# Patient Record
Sex: MALE | Race: WHITE | NOT HISPANIC OR LATINO
[De-identification: names, ages, dates, MRNs, and addresses within clinical notes are randomized per-mention and may not be internally consistent; named-entity substitution may affect disease eponyms.]

---

## 2017-03-02 ENCOUNTER — APPOINTMENT (OUTPATIENT)
Dept: HUMAN REPRODUCTION | Facility: CLINIC | Age: 37
End: 2017-03-02

## 2018-11-27 ENCOUNTER — APPOINTMENT (OUTPATIENT)
Dept: ORTHOPEDIC SURGERY | Facility: CLINIC | Age: 38
End: 2018-11-27
Payer: COMMERCIAL

## 2018-11-27 VITALS — HEIGHT: 71 IN | BODY MASS INDEX: 30.8 KG/M2 | WEIGHT: 220 LBS

## 2018-11-27 VITALS — HEART RATE: 93 BPM | SYSTOLIC BLOOD PRESSURE: 154 MMHG | DIASTOLIC BLOOD PRESSURE: 92 MMHG

## 2018-11-27 DIAGNOSIS — Z78.9 OTHER SPECIFIED HEALTH STATUS: ICD-10-CM

## 2018-11-27 DIAGNOSIS — F19.90 OTHER PSYCHOACTIVE SUBSTANCE USE, UNSPECIFIED, UNCOMPLICATED: ICD-10-CM

## 2018-11-27 DIAGNOSIS — Z87.891 PERSONAL HISTORY OF NICOTINE DEPENDENCE: ICD-10-CM

## 2018-11-27 PROCEDURE — 99204 OFFICE O/P NEW MOD 45 MIN: CPT

## 2018-11-27 PROCEDURE — 73564 X-RAY EXAM KNEE 4 OR MORE: CPT | Mod: RT

## 2018-12-11 ENCOUNTER — OTHER (OUTPATIENT)
Age: 38
End: 2018-12-11

## 2018-12-13 ENCOUNTER — OUTPATIENT (OUTPATIENT)
Dept: OUTPATIENT SERVICES | Facility: HOSPITAL | Age: 38
LOS: 1 days | End: 2018-12-13
Payer: COMMERCIAL

## 2018-12-13 VITALS
SYSTOLIC BLOOD PRESSURE: 134 MMHG | RESPIRATION RATE: 18 BRPM | HEIGHT: 71 IN | DIASTOLIC BLOOD PRESSURE: 90 MMHG | WEIGHT: 220.02 LBS | OXYGEN SATURATION: 97 %

## 2018-12-13 DIAGNOSIS — S83.289A OTHER TEAR OF LATERAL MENISCUS, CURRENT INJURY, UNSPECIFIED KNEE, INITIAL ENCOUNTER: ICD-10-CM

## 2018-12-13 DIAGNOSIS — Z98.890 OTHER SPECIFIED POSTPROCEDURAL STATES: Chronic | ICD-10-CM

## 2018-12-13 DIAGNOSIS — Z01.818 ENCOUNTER FOR OTHER PREPROCEDURAL EXAMINATION: ICD-10-CM

## 2018-12-13 DIAGNOSIS — G47.33 OBSTRUCTIVE SLEEP APNEA (ADULT) (PEDIATRIC): ICD-10-CM

## 2018-12-13 DIAGNOSIS — S83.281A OTHER TEAR OF LATERAL MENISCUS, CURRENT INJURY, RIGHT KNEE, INITIAL ENCOUNTER: ICD-10-CM

## 2018-12-13 PROCEDURE — G0463: CPT

## 2018-12-13 NOTE — H&P PST ADULT - PMH
Other tear of lateral meniscus of knee BRITTANY (obstructive sleep apnea)  Criteria  Other tear of lateral meniscus of knee

## 2018-12-13 NOTE — H&P PST ADULT - HISTORY OF PRESENT ILLNESS
38 year old healthy male  presents with right knee pain which began after he twisted his knee when his skis in February 2017 . He states he can no longer ride a bike, ski or jog. At times he has pain when walking and climbing steps. Presents to PST for scheduled Right Knee Arthroscopy ,Meniscectomy on 12/20/2018.

## 2018-12-13 NOTE — H&P PST ADULT - PROBLEM SELECTOR PLAN 1
Right Knee Arthroscopy ,Meniscectomy on 12/20/18  Pre- Op instructions discussed   Pre- emptive ordered

## 2018-12-13 NOTE — H&P PST ADULT - NSANTHOSAYNRD_GEN_A_CORE
mild/No. BRITTANY screening performed.  STOP BANG Legend: 0-2 = LOW Risk; 3-4 = INTERMEDIATE Risk; 5-8 = HIGH Risk

## 2018-12-28 ENCOUNTER — APPOINTMENT (OUTPATIENT)
Dept: ORTHOPEDIC SURGERY | Facility: HOSPITAL | Age: 38
End: 2018-12-28

## 2019-01-04 ENCOUNTER — APPOINTMENT (OUTPATIENT)
Dept: ORTHOPEDIC SURGERY | Facility: CLINIC | Age: 39
End: 2019-01-04

## 2020-01-02 PROBLEM — Z00.00 ENCOUNTER FOR PREVENTIVE HEALTH EXAMINATION: Noted: 2020-01-02

## 2020-01-03 ENCOUNTER — APPOINTMENT (OUTPATIENT)
Dept: ORTHOPEDIC SURGERY | Facility: CLINIC | Age: 40
End: 2020-01-03

## 2020-01-03 ENCOUNTER — APPOINTMENT (OUTPATIENT)
Dept: ORTHOPEDIC SURGERY | Facility: CLINIC | Age: 40
End: 2020-01-03
Payer: COMMERCIAL

## 2020-01-03 VITALS
HEIGHT: 70 IN | DIASTOLIC BLOOD PRESSURE: 100 MMHG | HEART RATE: 91 BPM | WEIGHT: 220 LBS | SYSTOLIC BLOOD PRESSURE: 153 MMHG | BODY MASS INDEX: 31.5 KG/M2

## 2020-01-03 DIAGNOSIS — M79.10 MYALGIA, UNSPECIFIED SITE: ICD-10-CM

## 2020-01-03 DIAGNOSIS — M60.9 MYOSITIS, UNSPECIFIED: ICD-10-CM

## 2020-01-03 DIAGNOSIS — M54.5 LOW BACK PAIN: ICD-10-CM

## 2020-01-03 DIAGNOSIS — M46.1 SACROILIITIS, NOT ELSEWHERE CLASSIFIED: ICD-10-CM

## 2020-01-03 PROCEDURE — 99214 OFFICE O/P EST MOD 30 MIN: CPT

## 2020-01-03 PROCEDURE — 72100 X-RAY EXAM L-S SPINE 2/3 VWS: CPT

## 2020-01-03 RX ORDER — PENICILLIN V POTASSIUM 500 MG/1
500 TABLET, FILM COATED ORAL
Qty: 20 | Refills: 0 | Status: DISCONTINUED | COMMUNITY
Start: 2019-10-03

## 2020-01-03 RX ORDER — DICLOFENAC SODIUM 75 MG/1
75 TABLET, DELAYED RELEASE ORAL
Qty: 60 | Refills: 2 | Status: ACTIVE | COMMUNITY
Start: 2020-01-03 | End: 1900-01-01

## 2020-01-03 NOTE — DISCUSSION/SUMMARY
[de-identified] : left sacroiliitis\par We discussed all options. \par injection\par I offered an injection after all risks were explained including allergic reaction to an infection under sterile conditions 1 mg of Depo-Medrol and 2 cc of 1% lidocaine without epinephrine was injected into the painful site. The patient tolerated the procedure well and received significant relief following the injection.\par Voltaren and lumbar brochure, if no better 3-4 weeks MRI lumbar. \par All options discussed including rest, medicine, home exercise, acupuncture, Chiropractic care, Physical Therapy, Pain management, and last resort surgery. \par All questions were answered, all alternatives discussed and the patient is in complete agreement with that plan. Follow-up appointment as instructed. Any issues and the patient will call or come in sooner.

## 2020-01-03 NOTE — PHYSICAL EXAM
[UE/LE] : Sensory: Intact in bilateral upper & lower extremities [ALL] : dorsalis pedis, posterior tibial, femoral, popliteal, and radial 2+ and symmetric bilaterally [Normal] : Gait: normal [Poor Appearance] : well-appearing [Acute Distress] : not in acute distress [de-identified] : 5 out of 5 motor strength, sensation is intact and symmetrical full range of motion flexion extension and rotation, no palpatory tenderness full range of motion of hips knees shoulders and elbows (all four extremities), no atrophy, negative straight leg raise, no pathological reflexes, no swelling, normal ambulation, no apparent distress skin is intact, no palpable lymph nodes, no upper or lower extremity instability, alert and oriented x3 and normal mood. Normal finger-to nose test. Left SI joint pain. [de-identified] : Xr AP/lateral lumbar spine 1/03/20:adequate-Reviewed with patient.

## 2020-01-03 NOTE — ADDENDUM
[FreeTextEntry1] : This note was authored by Lina Stephens working as a medical scribe for Dr. Tanner Villalba. The note was reviewed, edited, and revised by Dr. Tanner Villalba whom is in agreement with the exam findings, imaging findings, and treatment plan. Jan 03, 2020

## 2020-01-03 NOTE — HISTORY OF PRESENT ILLNESS
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 This history is in addition to the intake form that I personally reviewed. \par \par No fever chills sweats nausea vomiting no bowel or bladder dysfunction, no recent weight loss or gain no night pain. This history is in addition to the intake form that I personally reviewed.

## 2021-11-17 PROBLEM — G47.33 OBSTRUCTIVE SLEEP APNEA (ADULT) (PEDIATRIC): Chronic | Status: ACTIVE | Noted: 2018-12-13

## 2021-11-17 PROBLEM — S83.289A OTHER TEAR OF LATERAL MENISCUS, CURRENT INJURY, UNSPECIFIED KNEE, INITIAL ENCOUNTER: Chronic | Status: ACTIVE | Noted: 2018-12-13

## 2021-11-23 ENCOUNTER — APPOINTMENT (OUTPATIENT)
Dept: ORTHOPEDIC SURGERY | Facility: CLINIC | Age: 41
End: 2021-11-23
Payer: COMMERCIAL

## 2021-11-23 ENCOUNTER — NON-APPOINTMENT (OUTPATIENT)
Age: 41
End: 2021-11-23

## 2021-11-23 VITALS
WEIGHT: 220 LBS | DIASTOLIC BLOOD PRESSURE: 87 MMHG | HEIGHT: 70 IN | HEART RATE: 91 BPM | BODY MASS INDEX: 31.5 KG/M2 | SYSTOLIC BLOOD PRESSURE: 150 MMHG

## 2021-11-23 DIAGNOSIS — M25.512 PAIN IN LEFT SHOULDER: ICD-10-CM

## 2021-11-23 PROCEDURE — 73564 X-RAY EXAM KNEE 4 OR MORE: CPT | Mod: RT

## 2021-11-23 PROCEDURE — 73030 X-RAY EXAM OF SHOULDER: CPT | Mod: LT

## 2021-11-23 PROCEDURE — 99214 OFFICE O/P EST MOD 30 MIN: CPT

## 2021-11-23 NOTE — PHYSICAL EXAM
[LE] : Sensory: Intact in bilateral lower extremities [DP] : dorsalis pedis 2+ and symmetric bilaterally [PT] : posterior tibial 2+ and symmetric bilaterally [Rad] : radial 2+ and symmetric bilaterally [Normal] : Alert and in no acute distress [Poor Appearance] : well-appearing [Acute Distress] : not in acute distress [Obese] : not obese [de-identified] : The patient has no respiratory distress. Mood and affect are normal. The patient is alert and oriented to person, place and time.\par Examination of the cervical spine demonstrates no tenderness, no deformity and no muscle spasm. Cervical spine rotation is 60° to the right, 60° to the left, 75° of extension and 45° of flexion. Neurologic exam of the upper extremities reveals intact sensation to light touch. Motor function is 5 over 5 in all groups. Deep tendon reflexes are 2+ and equal at the biceps, triceps and brachioradialis.\par Examination of the left shoulder demonstrates tenderness at the left AC joint.  There is pain in this area with elevation and with adduction.  There is no skin compromise.  There is no glenohumeral instability.  Drop arm test is negative.  Belly press test is negative.  Impingement is positive.  The elbows are stable and nontender.  The skin is intact.  There is no lymphedema.\par There is no pain with motion of the hips.  There is no tenderness of either hip.  Examination of the right knee demonstrates lateral joint line tenderness.  Florencia test is positive laterally.  Bounce test is positive laterally.  There is no instability of collateral or cruciate ligaments.  Range of motion 0 to 115 degrees right, 0 to 115 degrees left.  The calves are soft and nontender.  The skin is intact.  There is no lymphedema. [de-identified] : AP, transscapular and axillary x-rays of the left shoulder demonstrate no acute fracture or dislocation.  There is evidence of old type III acromioclavicular injury.\par AP, lateral, tunnel and sunrise x-rays of the right knee taken today demonstrate no fracture or dislocation.  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

## 2021-11-23 NOTE — DISCUSSION/SUMMARY
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  I have discussed the pathology, natural history and treatment options with him.  Treatment will be home exercises and over-the-counter medication.  In terms of his right knee he has a known tear of his lateral meniscus.  Treatment options were discussed in detail.  He would like to try a course of physical therapy.  He will be reevaluated in 1 month.

## 2021-11-23 NOTE — HISTORY OF PRESENT ILLNESS
[de-identified] : This 41-year-old left-hand-dominant man sustained a left shoulder AC joint separation 10 years ago. This was treated nonoperatively. He has been having an increase in pain over the last 6 months without recent injury. The pain is worse with flexion of his shoulder. He is not taking any medication. He denies upper extremity numbness or tingling.\par He also requests evaluation of his right knee. He was seen 3 years ago and was diagnosed with a torn lateral meniscus. Arthroscopic surgery was recommended but not approved by his insurance carrier. He reports that he is continuing to experience pain in his right knee on the lateral aspect. Recently the pain has been getting worse.

## 2022-01-04 ENCOUNTER — APPOINTMENT (OUTPATIENT)
Dept: ORTHOPEDIC SURGERY | Facility: CLINIC | Age: 42
End: 2022-01-04

## 2022-10-06 ENCOUNTER — APPOINTMENT (OUTPATIENT)
Dept: ORTHOPEDIC SURGERY | Facility: CLINIC | Age: 42
End: 2022-10-06

## 2022-10-06 VITALS — WEIGHT: 220 LBS | HEIGHT: 71 IN | BODY MASS INDEX: 30.8 KG/M2

## 2022-10-06 DIAGNOSIS — M79.18 MYALGIA, OTHER SITE: ICD-10-CM

## 2022-10-06 DIAGNOSIS — I10 ESSENTIAL (PRIMARY) HYPERTENSION: ICD-10-CM

## 2022-10-06 PROCEDURE — 99204 OFFICE O/P NEW MOD 45 MIN: CPT

## 2022-10-06 PROCEDURE — 73564 X-RAY EXAM KNEE 4 OR MORE: CPT | Mod: LT

## 2022-10-06 RX ORDER — AMLODIPINE AND ATORVASTATIN 2.5; 4 MG/1; MG/1
TABLET, COATED ORAL
Refills: 0 | Status: ACTIVE | COMMUNITY

## 2022-10-06 RX ORDER — NAPROXEN 500 MG/1
500 TABLET ORAL TWICE DAILY
Qty: 60 | Refills: 0 | Status: ACTIVE | COMMUNITY
Start: 2022-10-06 | End: 1900-01-01

## 2022-10-06 NOTE — HISTORY OF PRESENT ILLNESS
[de-identified] : 42 year old male  ( LHD,  Rep  )  left knee pain since in [place] for bachelor party then acute worse since twisted knee with his 4 year old on 10/2/22. \par The pain is located lateral\par The pain is associated with  swelling, locking, catching.\par Worse with activity and better at rest.\par Has tried ice, advil\par

## 2022-10-06 NOTE — IMAGING
[de-identified] : LEFT KNEE\par Inspection:  mild effusion\par Palpation: lateral joint line tenderness \par Knee Range of Motion:  0-130 \par Strength: 5/5 Quadriceps strength, 5/5 Hamstring strength\par Neurological: light touch is intact throughout\par Ligament Stability and Special Tests: \par McMurrays: Positive\par Lachman: neg\par Pivot Shift: neg\par Posterior Drawer: neg\par Valgus: neg\par Varus: neg\par Patella Apprehension: neg\par Patella Maltracking: neg\par \par

## 2022-10-06 NOTE — ASSESSMENT
[FreeTextEntry1] : Left X-Ray Examination of the KNEE (4 views): there are no fractures, subluxations or dislocations.\par \par Due to the patients mechanical symptoms along with lateral joint line pain, effusion, and pos tali test on exam we will get an mri to eval for lateral meniscus tear\par \par - The patient was advised to apply ice (wrapped in a towel or protective covering) to the area daily (20 minutes at a time, 2-4X/day).\par - The patient was advised to modify their activities.\par - naproxen \par - Patient was given a prescription for an anti-inflammatory medication.  They will take it for the next week and then on an as needed basis, as long as there are no medical contra-indications.  Patient is counseled on possible GI, renal, and cardiovascular side effects.\par - f/u after mri\par

## 2022-10-11 ENCOUNTER — FORM ENCOUNTER (OUTPATIENT)
Age: 42
End: 2022-10-11

## 2022-10-12 ENCOUNTER — APPOINTMENT (OUTPATIENT)
Dept: MRI IMAGING | Facility: CLINIC | Age: 42
End: 2022-10-12

## 2022-10-12 PROCEDURE — 73721 MRI JNT OF LWR EXTRE W/O DYE: CPT | Mod: LT

## 2022-10-17 ENCOUNTER — APPOINTMENT (OUTPATIENT)
Dept: ORTHOPEDIC SURGERY | Facility: CLINIC | Age: 42
End: 2022-10-17

## 2022-10-17 DIAGNOSIS — M76.892 OTHER SPECIFIED ENTHESOPATHIES OF LEFT LOWER LIMB, EXCLUDING FOOT: ICD-10-CM

## 2022-10-17 DIAGNOSIS — M71.22 SYNOVIAL CYST OF POPLITEAL SPACE [BAKER], LEFT KNEE: ICD-10-CM

## 2022-10-17 PROCEDURE — 99214 OFFICE O/P EST MOD 30 MIN: CPT

## 2022-10-17 NOTE — IMAGING
[de-identified] : LEFT KNEE\par Inspection:  mild effusion\par Palpation: lateral joint line tenderness \par Knee Range of Motion:  0-130 \par Strength: 5/5 Quadriceps strength, 5/5 Hamstring strength\par Neurological: light touch is intact throughout\par Ligament Stability and Special Tests: \par McMurrays: Positive\par Lachman: neg\par Pivot Shift: neg\par Posterior Drawer: neg\par Valgus: neg\par Varus: neg\par Patella Apprehension: neg\par Patella Maltracking: neg\par \par

## 2022-10-17 NOTE — HISTORY OF PRESENT ILLNESS
[de-identified] : 42 year old male  ( LHD,  Rep  )  left knee pain since in [place] for bachelor party then acute worse since twisted knee with his 4 year old on 10/2/22. \par The pain is located lateral\par The pain is associated with  swelling, locking, catching.\par Worse with activity and better at rest.\par Has tried ice, advil\par \par 10/17/22 - had mri shwoing rup ganglia no tears

## 2022-10-17 NOTE — ASSESSMENT
[FreeTextEntry1] : mri left knee 10/12/22 - no tears, rup ganglia over lat meniscus, fluid around it band and fat pad, acl ganglia, quad tendonitis\par \par \par - The patient was advised of the diagnosis.  The natural history of the pathology was explained to the patient in layman's terms.  Several different treatment options were discussed and explained including the risks and benefits of both surgical and non-surgical treatments.  All questions and concerns were answered.\par - We will continue conservative treatment with PT, icing, and anti-inflammatory medications.\par - The patient was advised to let pain guide the gradual advancement of activities.\par - napryn \par - Patient was given a prescription for an anti-inflammatory medication.  They will take it for the next week and then on an as needed basis, as long as there are no medical contra-indications.  Patient is counseled on possible GI, renal, and cardiovascular side effects.\par - fu as needed

## 2022-10-17 NOTE — DATA REVIEWED
[MRI] : MRI [Left] : left [I independently reviewed and interpreted images and report] : I independently reviewed and interpreted images and report [Knee] : knee

## 2022-10-24 ENCOUNTER — APPOINTMENT (OUTPATIENT)
Dept: ORTHOPEDIC SURGERY | Facility: CLINIC | Age: 42
End: 2022-10-24

## 2022-10-24 VITALS — WEIGHT: 220 LBS | BODY MASS INDEX: 30.8 KG/M2 | HEIGHT: 71 IN

## 2022-10-24 DIAGNOSIS — Q74.1 CONGENITAL MALFORMATION OF KNEE: ICD-10-CM

## 2022-10-24 PROCEDURE — 99214 OFFICE O/P EST MOD 30 MIN: CPT

## 2022-10-24 PROCEDURE — 73564 X-RAY EXAM KNEE 4 OR MORE: CPT | Mod: RT

## 2022-10-24 NOTE — ASSESSMENT
[FreeTextEntry1] : Right  X-Ray Examination of the KNEE (4 views): there are no fractures, subluxations or dislocations. bipartite  patella \par \par Due to the patients mechanical symptoms along with medial joint line pain, effusion, and pos tali test on exam we will get an mri to eval for medial meniscus tear\par \par - The patient was advised to apply ice (wrapped in a towel or protective covering) to the area daily (20 minutes at a time, 2-4X/day).\par - The patient was advised to modify their activities.\par - f/u after mri\par \par \par Medication Discussion:\par 1) We discussed a comprehensive treatment plan that included possible pharmaceutical management involving the use of prescription strength medications including but not limited to options such as oral Naprosyn 500mg BID, once daily Meloxicam 15 mg, or 500-650 mg Tylenol versus over the counter oral medications in addition to discussing possible topical prescription Pennsaid vs  Voltaren gel.\par 2) There is a moderate risk of morbidity with further treatment, especially from use of prescription strength medications and possible side effects of these medications which include but are not limited to upset stomach with oral medications, skin reactions to topical medications and GI/cardiac/renal issues with long term use.\par 3) I recommended that the patient follow-up with their medical physician to discuss any significant specific potential issues with long term medication use such as interactions with current medications or with exacerbation of underlying medical comorbidities.\par 4) The benefits and risks associated with use of oral and / or topical prescription and over the counter anti-inflammatory medications were discussed with the patient. The patient voiced understanding of the risks including but not limited to bleeding, stroke, kidney dysfunction, heart disease, and were referred to the black box warning label for further information.\par \par \par

## 2022-10-24 NOTE — HISTORY OF PRESENT ILLNESS
[de-identified] : 42 year old male  ( LHD,  Rep, seen for left knee in the past )  right knee chronic pain worsen 2022  \par The pain is located anterior, lateral  , medial\par The pain is associated with  swelling, clicking, cracking, buckling , catching\par Worse with activity and better at rest.\par Has tried ice , activ mod. nsaids, PT, was suppsoed to get meniscus surgery by outside doc but cancelled.\par \par

## 2022-10-24 NOTE — IMAGING
[de-identified] : RIGHT KNEE\par Inspection:  mild effusion\par Palpation: medial joint line tenderness \par Knee Range of Motion:  0-130 \par Strength: 5/5 Quadriceps strength, 5/5 Hamstring strength\par Neurological: light touch is intact throughout\par Ligament Stability and Special Tests: \par McMurrays: Positive\par Lachman: neg\par Pivot Shift: neg\par Posterior Drawer: neg\par Valgus: neg\par Varus: neg\par Patella Apprehension: neg\par Patella Maltracking: neg\par \par

## 2022-10-28 ENCOUNTER — APPOINTMENT (OUTPATIENT)
Dept: MRI IMAGING | Facility: CLINIC | Age: 42
End: 2022-10-28

## 2022-11-03 ENCOUNTER — APPOINTMENT (OUTPATIENT)
Dept: ORTHOPEDIC SURGERY | Facility: CLINIC | Age: 42
End: 2022-11-03

## 2022-11-03 PROCEDURE — J3490M: CUSTOM

## 2022-11-03 PROCEDURE — 20610 DRAIN/INJ JOINT/BURSA W/O US: CPT | Mod: RT

## 2022-11-03 PROCEDURE — 99214 OFFICE O/P EST MOD 30 MIN: CPT | Mod: 25

## 2022-11-03 NOTE — ASSESSMENT
[FreeTextEntry1] : \par \par Due to the patients mechanical symptoms along with medial joint line pain, effusion, and pos tali test on exam we will get an mri to eval for medial meniscus tear\par \par - The patient was advised to apply ice (wrapped in a towel or protective covering) to the area daily (20 minutes at a time, 2-4X/day).\par - The patient was advised to modify their activities.\par - We also discussed the possible of a corticosteroid injection in order to help decrease inflammation and pain so that they can perform better therapy and they wished to proceed with this treatment course.\par - f/u after mri\par

## 2022-11-03 NOTE — HISTORY OF PRESENT ILLNESS
[de-identified] : 42 year old male  ( LHD,  Rep, seen for left knee in the past )  right knee chronic pain worsen 2022  \par The pain is located anterior, lateral  , medial\par The pain is associated with  swelling, clicking, cracking, buckling , catching\par Worse with activity and better at rest.\par Has tried ice , activ mod. nsaids, PT, was suppsoed to get meniscus surgery by outside doc but cancelled.\par \par 11/3/22 - cont to have worseing right knee pain along with catching and buckling, didn’t get mri approve yet, daughter jumped on hm and exacb knee even worse, has been icing and using nsaidsand doing HEP\par \par

## 2022-11-03 NOTE — IMAGING
[de-identified] : RIGHT KNEE\par Inspection:  mod effusion\par Palpation: medial joint line tenderness \par Knee Range of Motion:  0-130 \par Strength: 5/5 Quadriceps strength, 5/5 Hamstring strength\par Neurological: light touch is intact throughout\par Ligament Stability and Special Tests: \par McMurrays: Positive\par Lachman: neg\par Pivot Shift: neg\par Posterior Drawer: neg\par Valgus: neg\par Varus: neg\par Patella Apprehension: neg\par Patella Maltracking: neg\par \par

## 2022-11-08 ENCOUNTER — FORM ENCOUNTER (OUTPATIENT)
Age: 42
End: 2022-11-08

## 2022-11-09 ENCOUNTER — APPOINTMENT (OUTPATIENT)
Dept: MRI IMAGING | Facility: CLINIC | Age: 42
End: 2022-11-09

## 2022-11-09 PROCEDURE — 73721 MRI JNT OF LWR EXTRE W/O DYE: CPT | Mod: RT

## 2022-11-14 ENCOUNTER — APPOINTMENT (OUTPATIENT)
Dept: ORTHOPEDIC SURGERY | Facility: CLINIC | Age: 42
End: 2022-11-14

## 2022-11-14 DIAGNOSIS — S83.271A COMPLEX TEAR OF LATERAL MENISCUS, CURRENT INJURY, RIGHT KNEE, INITIAL ENCOUNTER: ICD-10-CM

## 2022-11-14 PROCEDURE — 99214 OFFICE O/P EST MOD 30 MIN: CPT

## 2022-11-14 NOTE — DISCUSSION/SUMMARY
[de-identified] : The patient was advised of the diagnosis.  The natural history of the pathology was explained in full to the patient in layman's terms.  Several different treatment options were discussed and explained to the patient including the risks and benefits of both surgical and non-surgical treatments.\par \par The risks, benefits, and alternatives to surgical arthroscopy of the right knee with partial lateral meniscectomy and plica excision were reviewed with the patient.  Specifically, I reviewed with the patient that any anterior knee pain may paradoxically worsen for the first six weeks following arthroscopy due to quadriceps weakness. Further, I reviewed with the patient that while arthroscopic treatment typically provides substantial relief of the symptoms (posteromedial or posterolateral joint line pain and mechanical symptoms) related to meniscus tears, arthroscopic treatments typically have very minimal relief of symptoms (anterior knee pain) related to chondromalacia patella or early osteoarthritis.  The risk of recurrent tears as well as progression of occult or underlying arthritis, avascular necrosis, and / or chondrolysis were discussed as well. The patient clearly communicated that these issues were understood.  \par \par We also discussed that the risks of surgery include but are not limited to pain, infection (superficial or deep), bleeding, vascular injury, numbness, tingling, nerve damage (direct or indirect), scarring, wound breakdown, failure to resolve symptoms, symptom recurrence, the need for further surgery as well as medical complications such as blood clots, pulmonary embolism, heart attack, stroke, and other anesthesia complications including even death.  The patient clearly communicated that these risks were understood and wished to proceed. This will be scheduled accordingly.\par \par \par

## 2022-11-14 NOTE — HISTORY OF PRESENT ILLNESS
[de-identified] : 42 year old male  ( LHD,  Rep, seen for left knee in the past ) b/l knee\par Left knee pain since in [place] for bachelor party then acute worse since twisted knee with his 4 year old on 10/2/22. \par The pain is located lateral\par The pain is associated with swelling, locking, catching.\par Worse with activity and better at rest.\par Has tried ice, advil\par  \par Right knee chronic pain worsen 2022  \par The pain is located anterior, lateral  , medial\par The pain is associated with  swelling, clicking, locking, buckling , catching\par Worse with activity and better at rest.\par Has tried ice , activ mod. nsaids, PT, was suppsoed to get meniscus surgery by outside doc but cancelled.\par \par 10/17/22 - had mri lef tknee  shwoing rup ganglia no tears\par \par 11/3/22 - cont to have worseing right knee pain along with catching and buckling, didn’t get mri approve yet, daughter jumped on hm and exacb knee even worse, has been icing and using nsaidsand doing HEP\par \par 11/14/22 - had CSI (11/3) right knee with mild relief, had mri shwoing LMT and cont lateral pain and sesne catcghin\par \par

## 2022-11-14 NOTE — ASSESSMENT
[FreeTextEntry1] : mri left knee 10/12/22 - no tears, rup ganglia over lat meniscus, fluid around it band and fat pad, acl ganglia, quad tendonitis\par mri right knee 11/9/22 - LMT, plica, pop cyst, eff. tendonitis\par \par \par - We discussed their diagnosis and treatment options at length including the risks and benefits of both surgical and non-surgical options.\par - Given their active lifestyle along with continued pain and mechanical symptoms despite conservative treatment they are a surgical candidate.\par - The risks, benefits, and alternatives to right  knee PLM, plica excision were discussed with the patient, all questions were answered.\par

## 2022-11-14 NOTE — IMAGING
[de-identified] : RIGHT KNEE\par Inspection:  mod effusion\par Palpation: lateral joint line tenderness \par Knee Range of Motion:  0-130 \par Strength: 5/5 Quadriceps strength, 5/5 Hamstring strength\par Neurological: light touch is intact throughout\par Ligament Stability and Special Tests: \par McMurrays: Positive\par Lachman: neg\par Pivot Shift: neg\par Posterior Drawer: neg\par Valgus: neg\par Varus: neg\par Patella Apprehension: neg\par Patella Maltracking: neg\par \par \par LEFT KNEE\par Inspection:  minimal effusion\par Palpation: medial facet of the patella tenderness \par Knee Range of Motion:  0-135\par Strength: 5/5 Quadriceps strength, 5/5 Hamstring strength, 4/5 Hip Abductor strength\par Neurological: light touch is intact throughout\par Ligament Stability and Special Tests: \par McMurrays: neg\par Lachman: neg\par Pivot Shift: neg\par Posterior Drawer: neg\par Valgus: neg\par Varus: neg\par Patella Apprehension: neg\par Patella Maltracking: neg\par \par

## 2022-11-17 ENCOUNTER — FORM ENCOUNTER (OUTPATIENT)
Age: 42
End: 2022-11-17

## 2022-12-13 ENCOUNTER — LABORATORY RESULT (OUTPATIENT)
Age: 42
End: 2022-12-13

## 2022-12-16 ENCOUNTER — APPOINTMENT (OUTPATIENT)
Age: 42
End: 2022-12-16

## 2022-12-16 DIAGNOSIS — Z98.890 OTHER SPECIFIED POSTPROCEDURAL STATES: ICD-10-CM

## 2022-12-16 PROCEDURE — 29875 ARTHRS KNEE SURG SYNVCT LMTD: CPT | Mod: AS,59,RT

## 2022-12-16 PROCEDURE — 29881 ARTHRS KNE SRG MNISECTMY M/L: CPT | Mod: RT

## 2022-12-16 PROCEDURE — 29881 ARTHRS KNE SRG MNISECTMY M/L: CPT | Mod: AS,RT

## 2022-12-16 PROCEDURE — 29875 ARTHRS KNEE SURG SYNVCT LMTD: CPT | Mod: 59,RT

## 2022-12-16 RX ORDER — IBUPROFEN 600 MG/1
600 TABLET, FILM COATED ORAL EVERY 6 HOURS
Qty: 20 | Refills: 0 | Status: COMPLETED | COMMUNITY
Start: 2022-12-16 | End: 2022-12-21

## 2022-12-16 RX ORDER — PROMETHAZINE HYDROCHLORIDE 12.5 MG/1
12.5 TABLET ORAL EVERY 6 HOURS
Qty: 20 | Refills: 0 | Status: COMPLETED | COMMUNITY
Start: 2022-12-16 | End: 2022-12-21

## 2022-12-16 RX ORDER — OXYCODONE 5 MG/1
5 TABLET ORAL
Qty: 14 | Refills: 0 | Status: ACTIVE | COMMUNITY
Start: 2022-12-16 | End: 1900-01-01

## 2022-12-16 RX ORDER — ACETAMINOPHEN 500 MG/1
500 TABLET ORAL 3 TIMES DAILY
Qty: 90 | Refills: 0 | Status: COMPLETED | COMMUNITY
Start: 2022-12-16 | End: 2022-12-31

## 2022-12-21 PROBLEM — S83.271A COMPLEX TEAR OF LATERAL MENISCUS OF RIGHT KNEE AS CURRENT INJURY, INITIAL ENCOUNTER: Status: RESOLVED | Noted: 2022-11-14 | Resolved: 2022-12-21

## 2022-12-21 PROBLEM — M23.91 INTERNAL DERANGEMENT OF RIGHT KNEE: Status: RESOLVED | Noted: 2022-10-24 | Resolved: 2022-12-21

## 2022-12-21 PROBLEM — M67.51 SYNOVIAL PLICA SYNDROME OF RIGHT KNEE: Status: RESOLVED | Noted: 2022-11-14 | Resolved: 2022-12-21

## 2022-12-22 ENCOUNTER — APPOINTMENT (OUTPATIENT)
Dept: ORTHOPEDIC SURGERY | Facility: CLINIC | Age: 42
End: 2022-12-22

## 2022-12-22 DIAGNOSIS — M67.51 PLICA SYNDROME, RIGHT KNEE: ICD-10-CM

## 2022-12-22 DIAGNOSIS — S83.271A COMPLEX TEAR OF LATERAL MENISCUS, CURRENT INJURY, RIGHT KNEE, INITIAL ENCOUNTER: ICD-10-CM

## 2022-12-22 DIAGNOSIS — M23.91 UNSPECIFIED INTERNAL DERANGEMENT OF RIGHT KNEE: ICD-10-CM

## 2022-12-22 PROCEDURE — 99024 POSTOP FOLLOW-UP VISIT: CPT

## 2022-12-22 NOTE — IMAGING
[de-identified] : \par RIGHT KNEE\par Inspection:  mild effusion, incisions c/d/i\par Palpation: medial facet ttp\par Knee Range of Motion:  0-120\par Strength: 4/5 Quadriceps strength, 5/5 Hamstring strength\par Neurological: light touch is intact throughout\par Ligament Stability and Special Tests: \par McMurrays: neg\par Lachman: neg\par Pivot Shift: neg\par Posterior Drawer: neg\par Valgus: neg\par Varus: neg\par Patella Apprehension: neg\par Patella Maltracking: neg\par \par \par \par LEFT KNEE\par Inspection:  minimal effusion\par Palpation: medial facet of the patella tenderness \par Knee Range of Motion:  0-135\par Strength: 5/5 Quadriceps strength, 5/5 Hamstring strength, 4/5 Hip Abductor strength\par Neurological: light touch is intact throughout\par Ligament Stability and Special Tests: \par McMurrays: neg\par Lachman: neg\par Pivot Shift: neg\par Posterior Drawer: neg\par Valgus: neg\par Varus: neg\par Patella Apprehension: neg\par Patella Maltracking: neg\par \par

## 2022-12-22 NOTE — ASSESSMENT
[FreeTextEntry1] : mri left knee 10/12/22 - no tears, rup ganglia over lat meniscus, fluid around it band and fat pad, acl ganglia, quad tendonitis\par \par **s/p R knee PLM, plica on 12/16/22**\par \par - PT on post op protocol\par - The patient was provided with a prescription for Physical Therapy \par - Home exercises program learned at physical therapy.\par - The patient was advised to apply ice (wrapped in a towel or protective covering) to the area daily (20 minutes at a time, 2-4X/day).\par - fu 8 week re-eval\par

## 2022-12-22 NOTE — HISTORY OF PRESENT ILLNESS
[de-identified] : 42 year old male  ( LHD,  Rep, seen for left knee in the past ) b/l knee\par Left knee pain since in [place] for bachelor party then acute worse since twisted knee with his 4 year old on 10/2/22. \par The pain is located lateral\par The pain is associated with swelling, locking, catching.\par Worse with activity and better at rest.\par Has tried ice, advil\par  \par Right knee chronic pain worsen 2022  \par The pain is located anterior, lateral  , medial\par The pain is associated with  swelling, clicking, locking, buckling , catching\par Worse with activity and better at rest.\par Has tried ice , activ mod. nsaids, PT, was suppsoed to get meniscus surgery by outside doc but cancelled.\par \par 10/17/22 - had mri lef tknee  shwoing rup ganglia no tears\par \par 11/3/22 - cont to have worseing right knee pain along with catching and buckling, didn’t get mri approve yet, daughter jumped on hm and exacb knee even worse, has been icing and using nsaidsand doing HEP\par \par 11/14/22 - had CSI (11/3) right knee with mild relief, had mri shwoing LMT and cont lateral pain and sesne catcghin\par \par *** s/p R knee PLM, plica on 12/16/22***\par \par 12/22/22 - po visit, pain well controlled\par \par \par

## 2023-02-14 PROBLEM — S83.271D COMPLEX TEAR OF LATERAL MENISCUS OF RIGHT KNEE AS CURRENT INJURY, SUBSEQUENT ENCOUNTER: Status: ACTIVE | Noted: 2021-11-23

## 2023-02-14 PROBLEM — M94.262 CHONDROMALACIA OF LEFT KNEE: Status: ACTIVE | Noted: 2022-10-17

## 2023-02-14 PROBLEM — M23.92 INTERNAL DERANGEMENT OF LEFT KNEE: Status: RESOLVED | Noted: 2022-10-06 | Resolved: 2023-02-14

## 2023-02-16 ENCOUNTER — APPOINTMENT (OUTPATIENT)
Dept: ORTHOPEDIC SURGERY | Facility: CLINIC | Age: 43
End: 2023-02-16
Payer: COMMERCIAL

## 2023-02-16 DIAGNOSIS — M94.262 CHONDROMALACIA, LEFT KNEE: ICD-10-CM

## 2023-02-16 DIAGNOSIS — S83.271D COMPLEX TEAR OF LATERAL MENISCUS, CURRENT INJURY, RIGHT KNEE, SUBSEQUENT ENCOUNTER: ICD-10-CM

## 2023-02-16 DIAGNOSIS — M23.92 UNSPECIFIED INTERNAL DERANGEMENT OF LEFT KNEE: ICD-10-CM

## 2023-02-16 PROCEDURE — 99024 POSTOP FOLLOW-UP VISIT: CPT

## 2023-02-16 NOTE — ASSESSMENT
[FreeTextEntry1] : mri left knee 10/12/22 - no tears, rup ganglia over lat meniscus, fluid around it band and fat pad, acl ganglia, quad tendonitis\par \par **s/p R knee PLM, plica on 12/16/22**\par \par - cont PT on post op protocol\par - The patient was provided with a prescription for Physical Therapy \par - Home exercises program learned at physical therapy.\par - The patient was advised to apply ice (wrapped in a towel or protective covering) to the area daily (20 minutes at a time, 2-4X/day).\par - fu as needed

## 2023-02-16 NOTE — IMAGING
[de-identified] : \par RIGHT KNEE\par Inspection:  well healed surg scars, mild effusion\par Palpation: medial facet ttp\par Knee Range of Motion:  0-135\par Strength: 5-/5 Quadriceps strength, 5/5 Hamstring strength, 5-/5 hip abudcotrs\par Neurological: light touch is intact throughout\par Ligament Stability and Special Tests: \par McMurrays: neg\par Lachman: neg\par Pivot Shift: neg\par Posterior Drawer: neg\par Valgus: neg\par Varus: neg\par Patella Apprehension: neg\par Patella Maltracking: neg\par \par \par \par LEFT KNEE\par Inspection:  minimal effusion\par Palpation: medial facet of the patella tenderness \par Knee Range of Motion:  0-135\par Strength: 5/5 Quadriceps strength, 5/5 Hamstring strength, 4/5 Hip Abductor strength\par Neurological: light touch is intact throughout\par Ligament Stability and Special Tests: \par McMurrays: neg\par Lachman: neg\par Pivot Shift: neg\par Posterior Drawer: neg\par Valgus: neg\par Varus: neg\par Patella Apprehension: neg\par Patella Maltracking: neg\par \par

## 2023-02-16 NOTE — HISTORY OF PRESENT ILLNESS
[de-identified] : 42 year old male  ( LHD,  Rep, seen for left knee in the past ) b/l knee\par \par Left knee pain since in [place] then acute worse since twisted knee with his 4 year old on 10/2/22. \par The pain is located lateral\par The pain is associated with swelling, locking, catching.\par Worse with activity and better at rest.\par Has tried ice, advil\par  \par Right knee chronic pain worsen 2022  \par The pain is located anterior, lateral  , medial\par The pain is associated with  swelling, clicking, locking, buckling , catching\par Worse with activity and better at rest.\par Has tried ice , activ mod. nsaids, PT, was suppsoed to get meniscus surgery by outside doc but cancelled.\par \par 10/17/22 - had mri lef tknee  shwoing rup ganglia no tears\par \par 11/3/22 - cont to have worseing right knee pain along with catching and buckling, didn’t get mri approve yet, daughter jumped on hm and exacb knee even worse, has been icing and using nsaidsand doing HEP\par \par 11/14/22 - had R CSI (11/3) right knee with mild relief, had mri shwoing LMT and cont lateral pain and sesne catcghin\par \par *** s/p R knee PLM, plica on 12/16/22***\par \par 12/22/22 - po visit, pain well controlled\par 2/16/23 - right knee po doing PT and improving, still some achiness in left knee\par \par \par

## 2023-10-27 ENCOUNTER — APPOINTMENT (OUTPATIENT)
Dept: ORTHOPEDIC SURGERY | Facility: CLINIC | Age: 43
End: 2023-10-27
Payer: COMMERCIAL

## 2023-10-27 DIAGNOSIS — M77.12 LATERAL EPICONDYLITIS, LEFT ELBOW: ICD-10-CM

## 2023-10-27 PROCEDURE — 99213 OFFICE O/P EST LOW 20 MIN: CPT

## 2023-10-27 PROCEDURE — 73080 X-RAY EXAM OF ELBOW: CPT | Mod: LT

## 2023-10-27 RX ORDER — METOPROLOL TARTRATE 75 MG/1
TABLET, FILM COATED ORAL
Refills: 0 | Status: ACTIVE | COMMUNITY